# Patient Record
Sex: MALE | Race: WHITE | NOT HISPANIC OR LATINO | Employment: FULL TIME | URBAN - NONMETROPOLITAN AREA
[De-identification: names, ages, dates, MRNs, and addresses within clinical notes are randomized per-mention and may not be internally consistent; named-entity substitution may affect disease eponyms.]

---

## 2018-01-14 ENCOUNTER — OFFICE VISIT (OUTPATIENT)
Dept: RETAIL CLINIC | Facility: CLINIC | Age: 33
End: 2018-01-14

## 2018-01-14 VITALS
TEMPERATURE: 98.9 F | SYSTOLIC BLOOD PRESSURE: 134 MMHG | HEART RATE: 76 BPM | RESPIRATION RATE: 18 BRPM | DIASTOLIC BLOOD PRESSURE: 80 MMHG | OXYGEN SATURATION: 98 % | WEIGHT: 315 LBS

## 2018-01-14 DIAGNOSIS — R68.89 FLU-LIKE SYMPTOMS: Primary | ICD-10-CM

## 2018-01-14 DIAGNOSIS — Z20.828 EXPOSURE TO THE FLU: ICD-10-CM

## 2018-01-14 LAB
EXPIRATION DATE: NORMAL
FLUAV AG NPH QL: NORMAL
FLUBV AG NPH QL: NORMAL
INTERNAL CONTROL: NORMAL
Lab: NORMAL

## 2018-01-14 PROCEDURE — 99203 OFFICE O/P NEW LOW 30 MIN: CPT | Performed by: NURSE PRACTITIONER

## 2018-01-14 PROCEDURE — 87804 INFLUENZA ASSAY W/OPTIC: CPT | Performed by: NURSE PRACTITIONER

## 2018-01-14 RX ORDER — CITALOPRAM 20 MG/1
20 TABLET ORAL DAILY
COMMUNITY

## 2018-01-14 RX ORDER — OSELTAMIVIR PHOSPHATE 75 MG/1
75 CAPSULE ORAL 2 TIMES DAILY
Qty: 10 CAPSULE | Refills: 0 | Status: SHIPPED | OUTPATIENT
Start: 2018-01-14 | End: 2018-01-19

## 2018-01-14 NOTE — PROGRESS NOTES
Subjective   Kwadwo Katz is a 32 y.o. male.     Flu Symptoms   This is a new problem. The current episode started in the past 7 days. The problem occurs constantly. The problem has been unchanged. Associated symptoms include chills, congestion, coughing, fatigue, headaches and myalgias. Pertinent negatives include no abdominal pain, arthralgias, change in bowel habit, chest pain, fever, nausea, neck pain, sore throat or vomiting. Nothing aggravates the symptoms. He has tried nothing for the symptoms.        The following portions of the patient's history were reviewed and updated as appropriate: allergies, current medications, past family history, past medical history, past social history, past surgical history and problem list.    Review of Systems   Constitutional: Positive for chills and fatigue. Negative for fever.   HENT: Positive for congestion. Negative for sore throat and trouble swallowing.    Eyes: Negative for redness.   Respiratory: Positive for cough. Negative for shortness of breath and wheezing.    Cardiovascular: Negative for chest pain.   Gastrointestinal: Negative for abdominal pain, change in bowel habit, diarrhea, nausea and vomiting.   Musculoskeletal: Positive for myalgias. Negative for arthralgias and neck pain.   Neurological: Positive for headaches. Negative for dizziness and light-headedness.       Objective      /80  Pulse 76  Temp 98.9 °F (37.2 °C) (Oral)   Resp 18  Wt (!) 156 kg (343 lb)  SpO2 98%    Physical Exam   Constitutional: He is oriented to person, place, and time. He appears well-developed and well-nourished. No distress.   HENT:   Head: Normocephalic and atraumatic.   Right Ear: Hearing, tympanic membrane, external ear and ear canal normal.   Left Ear: Hearing, tympanic membrane, external ear and ear canal normal.   Mouth/Throat: Uvula is midline and mucous membranes are normal.   Eyes: Conjunctivae and EOM are normal. Pupils are equal, round, and reactive to  light.   Neck: Normal range of motion and full passive range of motion without pain. Neck supple.   Cardiovascular: Normal rate, regular rhythm, normal heart sounds and intact distal pulses.  Exam reveals no gallop and no friction rub.    No murmur heard.  Pulmonary/Chest: Effort normal. No stridor. No respiratory distress. He has no wheezes.   Abdominal: Soft.   Musculoskeletal: Normal range of motion.   Neurological: He is alert and oriented to person, place, and time. No cranial nerve deficit.   Skin: Skin is warm and dry.   Psychiatric: He has a normal mood and affect. His behavior is normal. Judgment and thought content normal.   Nursing note and vitals reviewed.      Assessment/Plan   Kwadwo was seen today for flu symptoms.    Diagnoses and all orders for this visit:    Flu-like symptoms  -     POC Influenza A / B    Exposure to the flu  -     POC Influenza A / B    Other orders  -     oseltamivir (TAMIFLU) 75 MG capsule; Take 1 capsule by mouth 2 (Two) Times a Day for 5 days.    SEEK IMMEDIATE MEDICAL CARE IF:  · You have trouble breathing, you become short of breath, or your skin or nails become bluish.  · You have severe pain or stiffness in the neck.  · You develop a sudden headache, or pain in the face or ear.  · You have nausea or vomiting that you cannot control.    Return to see your Primary Care Provider if symptoms worsen in 2-3 days.    VENKAT Torres

## 2018-01-14 NOTE — PATIENT INSTRUCTIONS

## 2018-06-02 ENCOUNTER — APPOINTMENT (OUTPATIENT)
Dept: CT IMAGING | Facility: HOSPITAL | Age: 33
End: 2018-06-02

## 2018-06-02 ENCOUNTER — APPOINTMENT (OUTPATIENT)
Dept: GENERAL RADIOLOGY | Facility: HOSPITAL | Age: 33
End: 2018-06-02

## 2018-06-02 ENCOUNTER — HOSPITAL ENCOUNTER (EMERGENCY)
Facility: HOSPITAL | Age: 33
Discharge: HOME | End: 2018-06-02
Admitting: EMERGENCY MEDICINE

## 2018-06-02 VITALS
OXYGEN SATURATION: 99 % | WEIGHT: 315 LBS | SYSTOLIC BLOOD PRESSURE: 130 MMHG | DIASTOLIC BLOOD PRESSURE: 89 MMHG | HEART RATE: 86 BPM | HEIGHT: 66 IN | RESPIRATION RATE: 16 BRPM | TEMPERATURE: 97.7 F | BODY MASS INDEX: 50.62 KG/M2
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
## 2018-06-02 DIAGNOSIS — S80.01XA CONTUSION OF RIGHT KNEE, INITIAL ENCOUNTER: ICD-10-CM

## 2018-06-02 PROCEDURE — 90715 TDAP VACCINE 7 YRS/> IM: CPT | Performed by: NURSE PRACTITIONER

## 2018-06-02 PROCEDURE — 90471 IMMUNIZATION ADMIN: CPT | Performed by: NURSE PRACTITIONER

## 2018-06-02 PROCEDURE — 99282 EMERGENCY DEPT VISIT SF MDM: CPT

## 2018-06-02 PROCEDURE — 25010000002 TDAP 5-2.5-18.5 LF-MCG/0.5 SUSPENSION: Performed by: NURSE PRACTITIONER

## 2018-06-02 PROCEDURE — 73562 X-RAY EXAM OF KNEE 3: CPT

## 2018-06-02 PROCEDURE — 99283 EMERGENCY DEPT VISIT LOW MDM: CPT

## 2018-06-02 PROCEDURE — 71046 X-RAY EXAM CHEST 2 VIEWS: CPT

## 2018-06-02 PROCEDURE — 73590 X-RAY EXAM OF LOWER LEG: CPT

## 2018-06-02 PROCEDURE — 72125 CT NECK SPINE W/O DYE: CPT

## 2018-06-02 RX ORDER — CYCLOBENZAPRINE HCL 10 MG
10 TABLET ORAL 3 TIMES DAILY PRN
Qty: 5 TABLET | Refills: 0 | OUTPATIENT
Start: 2018-06-02 | End: 2021-07-15

## 2018-06-02 RX ORDER — IBUPROFEN 800 MG/1
800 TABLET ORAL EVERY 8 HOURS PRN
Qty: 30 TABLET | Refills: 0 | OUTPATIENT
Start: 2018-06-02 | End: 2021-07-15

## 2018-06-02 RX ADMIN — TETANUS TOXOID, REDUCED DIPHTHERIA TOXOID AND ACELLULAR PERTUSSIS VACCINE, ADSORBED 0.5 ML: 5; 2.5; 8; 8; 2.5 SUSPENSION INTRAMUSCULAR at 14:57

## 2018-06-02 NOTE — DISCHARGE INSTRUCTIONS
Continue to monitor symptoms. Follow up with PCP . You may be sore the next few days. Ibuprofen, heat/ice, gentle stretches. You may take tylenol as directed on the bottle. You may use muscle relaxer if needed, caution as they will make you drowsy. Follow up for any new or worsening symptoms.
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
## 2018-12-24 ENCOUNTER — APPOINTMENT (OUTPATIENT)
Dept: CT IMAGING | Age: 33
End: 2018-12-24
Payer: COMMERCIAL

## 2018-12-24 ENCOUNTER — HOSPITAL ENCOUNTER (EMERGENCY)
Age: 33
Discharge: HOME | End: 2018-12-24
Attending: EMERGENCY MEDICINE
Payer: COMMERCIAL

## 2018-12-24 VITALS
DIASTOLIC BLOOD PRESSURE: 90 MMHG | TEMPERATURE: 98.9 F | HEART RATE: 80 BPM | BODY MASS INDEX: 50.62 KG/M2 | RESPIRATION RATE: 18 BRPM | SYSTOLIC BLOOD PRESSURE: 138 MMHG | WEIGHT: 315 LBS | OXYGEN SATURATION: 98 % | HEIGHT: 66 IN

## 2018-12-24 DIAGNOSIS — M54.32 SCIATICA OF LEFT SIDE: ICD-10-CM

## 2018-12-24 DIAGNOSIS — M54.16 LUMBAR RADICULOPATHY, ACUTE: Primary | ICD-10-CM

## 2018-12-24 PROCEDURE — 72131 CT LUMBAR SPINE W/O DYE: CPT

## 2018-12-24 PROCEDURE — 99284 EMERGENCY DEPT VISIT MOD MDM: CPT | Performed by: EMERGENCY MEDICINE

## 2018-12-24 PROCEDURE — 6360000002 HC RX W HCPCS: Performed by: EMERGENCY MEDICINE

## 2018-12-24 PROCEDURE — 99283 EMERGENCY DEPT VISIT LOW MDM: CPT

## 2018-12-24 PROCEDURE — 96372 THER/PROPH/DIAG INJ SC/IM: CPT

## 2018-12-24 RX ORDER — CYCLOBENZAPRINE HCL 10 MG
10 TABLET ORAL 3 TIMES DAILY PRN
Qty: 20 TABLET | Refills: 0 | Status: SHIPPED | OUTPATIENT
Start: 2018-12-24 | End: 2019-01-03

## 2018-12-24 RX ORDER — BUSPIRONE HYDROCHLORIDE 10 MG/1
10 TABLET ORAL 3 TIMES DAILY
COMMUNITY

## 2018-12-24 RX ORDER — HYDROCODONE BITARTRATE AND ACETAMINOPHEN 7.5; 325 MG/1; MG/1
1 TABLET ORAL EVERY 6 HOURS PRN
Qty: 15 TABLET | Refills: 0 | Status: SHIPPED | OUTPATIENT
Start: 2018-12-24 | End: 2018-12-27

## 2018-12-24 RX ORDER — ONDANSETRON 4 MG/1
4 TABLET, ORALLY DISINTEGRATING ORAL ONCE
Status: COMPLETED | OUTPATIENT
Start: 2018-12-24 | End: 2018-12-24

## 2018-12-24 RX ORDER — METHYLPREDNISOLONE 4 MG/1
TABLET ORAL
Qty: 1 KIT | Refills: 0 | Status: SHIPPED | OUTPATIENT
Start: 2018-12-24 | End: 2018-12-30

## 2018-12-24 RX ORDER — CITALOPRAM 20 MG/1
20 TABLET ORAL DAILY
COMMUNITY

## 2018-12-24 RX ADMIN — ONDANSETRON 4 MG: 4 TABLET, ORALLY DISINTEGRATING ORAL at 22:09

## 2018-12-24 RX ADMIN — Medication 2 MG: at 22:09

## 2018-12-24 ASSESSMENT — ENCOUNTER SYMPTOMS
DIARRHEA: 0
CHOKING: 0
CONSTIPATION: 0
VOICE CHANGE: 0
BACK PAIN: 1
SHORTNESS OF BREATH: 0
SORE THROAT: 0
NAUSEA: 0
APNEA: 0
FACIAL SWELLING: 0
SINUS PRESSURE: 0
BLOOD IN STOOL: 0
EYE DISCHARGE: 0

## 2018-12-24 ASSESSMENT — PAIN SCALES - GENERAL
PAINLEVEL_OUTOF10: 9
PAINLEVEL_OUTOF10: 9

## 2018-12-24 ASSESSMENT — PAIN DESCRIPTION - PAIN TYPE: TYPE: ACUTE PAIN

## 2018-12-25 NOTE — ED PROVIDER NOTES
No data to display    All other labs were within normal range or not returned as of this dictation. EMERGENCY DEPARTMENT COURSE and DIFFERENTIALDIAGNOSIS/MDM:   Vitals:    Vitals:    12/24/18 1957   BP: (!) 146/94   Pulse: 84   Resp: 20   Temp: 99.2 °F (37.3 °C)   SpO2: 98%   Weight: (!) 345 lb (156.5 kg)   Height: 5' 6\" (1.676 m)       MDM  Number of Diagnoses or Management Options  Lumbar radiculopathy, acute:   Sciatica of left side:   Diagnosis management comments: We discussed the diagnosis and aftercare follow-up. CONSULTS:  None    PROCEDURES:  Unless otherwise notedbelow, none     Procedures    FINAL IMPRESSION     1. Lumbar radiculopathy, acute    2. Sciatica of left side          DISPOSITION/PLAN   DISPOSITION Decision To Discharge 12/24/2018 09:51:58 PM      PATIENT REFERRED TO:  @FUP@    DISCHARGE MEDICATIONS:  New Prescriptions    CYCLOBENZAPRINE (FLEXERIL) 10 MG TABLET    Take 1 tablet by mouth 3 times daily as needed for Muscle spasms    HYDROCODONE-ACETAMINOPHEN (NORCO) 7.5-325 MG PER TABLET    Take 1 tablet by mouth every 6 hours as needed for Pain for up to 3 days. . Take lowest dose possible to manage pain. METHYLPREDNISOLONE (MEDROL, KAROLINA,) 4 MG TABLET    By mouth. Attestation: The Prescription Monitoring Report for this patient was reviewed today.  Frances Lamas #[id number]) Mckay Fabian MD)    (Please note that portions of this note were completed with a voice recognition program.  Efforts were made to edit the dictations butoccasionally words are mis-transcribed.)    Mckay Fabian MD (electronically signed)  AttendingEmergency Physician          Mckay Fabian MD  12/24/18 7025

## 2018-12-26 ENCOUNTER — OFFICE VISIT (OUTPATIENT)
Dept: URGENT CARE | Age: 33
End: 2018-12-26
Payer: COMMERCIAL

## 2018-12-26 VITALS
TEMPERATURE: 98.4 F | HEIGHT: 65 IN | OXYGEN SATURATION: 98 % | HEART RATE: 79 BPM | SYSTOLIC BLOOD PRESSURE: 143 MMHG | DIASTOLIC BLOOD PRESSURE: 85 MMHG | WEIGHT: 315 LBS | RESPIRATION RATE: 22 BRPM | BODY MASS INDEX: 52.48 KG/M2

## 2018-12-26 DIAGNOSIS — M54.16 ACUTE LUMBAR RADICULOPATHY: Primary | ICD-10-CM

## 2018-12-26 PROCEDURE — 99201 PR OFFICE OUTPATIENT NEW 10 MINUTES: CPT | Performed by: NURSE PRACTITIONER

## 2020-02-05 ENCOUNTER — TRANSCRIBE ORDERS (OUTPATIENT)
Dept: ADMINISTRATIVE | Facility: HOSPITAL | Age: 35
End: 2020-02-05

## 2020-02-05 DIAGNOSIS — R20.2 PARESTHESIA OF SKIN: Primary | ICD-10-CM

## 2020-02-24 ENCOUNTER — HOSPITAL ENCOUNTER (OUTPATIENT)
Dept: NEUROLOGY | Facility: HOSPITAL | Age: 35
Discharge: HOME | End: 2020-02-24
Admitting: FAMILY MEDICINE

## 2020-02-24 DIAGNOSIS — R20.2 PARESTHESIA OF SKIN: ICD-10-CM

## 2020-02-24 PROCEDURE — 95909 NRV CNDJ TST 5-6 STUDIES: CPT

## 2020-02-24 PROCEDURE — 95886 MUSC TEST DONE W/N TEST COMP: CPT

## 2021-07-15 ENCOUNTER — HOSPITAL ENCOUNTER (EMERGENCY)
Facility: HOSPITAL | Age: 36
Discharge: HOME | End: 2021-07-15
Admitting: EMERGENCY MEDICINE

## 2021-07-15 ENCOUNTER — APPOINTMENT (OUTPATIENT)
Dept: GENERAL RADIOLOGY | Facility: HOSPITAL | Age: 36
End: 2021-07-15

## 2021-07-15 VITALS
RESPIRATION RATE: 18 BRPM | OXYGEN SATURATION: 98 % | TEMPERATURE: 98.9 F | HEART RATE: 106 BPM | WEIGHT: 315 LBS | HEIGHT: 66 IN | BODY MASS INDEX: 50.62 KG/M2 | SYSTOLIC BLOOD PRESSURE: 142 MMHG | DIASTOLIC BLOOD PRESSURE: 78 MMHG

## 2021-07-15 DIAGNOSIS — S61.012A LACERATION OF LEFT THUMB WITHOUT FOREIGN BODY WITHOUT DAMAGE TO NAIL, INITIAL ENCOUNTER: Primary | ICD-10-CM

## 2021-07-15 PROCEDURE — 73140 X-RAY EXAM OF FINGER(S): CPT

## 2021-07-15 PROCEDURE — 25010000003 LIDOCAINE 1 % SOLUTION: Performed by: NURSE PRACTITIONER

## 2021-07-15 PROCEDURE — 99283 EMERGENCY DEPT VISIT LOW MDM: CPT

## 2021-07-15 RX ORDER — HYDROCODONE BITARTRATE AND ACETAMINOPHEN 5; 325 MG/1; MG/1
1 TABLET ORAL ONCE
Status: COMPLETED | OUTPATIENT
Start: 2021-07-15 | End: 2021-07-15

## 2021-07-15 RX ORDER — CEPHALEXIN 500 MG/1
500 CAPSULE ORAL 3 TIMES DAILY
Qty: 21 CAPSULE | Refills: 0 | Status: SHIPPED | OUTPATIENT
Start: 2021-07-15 | End: 2021-07-22

## 2021-07-15 RX ORDER — HYDROCODONE BITARTRATE AND ACETAMINOPHEN 5; 325 MG/1; MG/1
1 TABLET ORAL EVERY 6 HOURS PRN
Qty: 8 TABLET | Refills: 0 | Status: SHIPPED | OUTPATIENT
Start: 2021-07-15 | End: 2021-07-17

## 2021-07-15 RX ORDER — LIDOCAINE HYDROCHLORIDE 10 MG/ML
10 INJECTION, SOLUTION INFILTRATION; PERINEURAL ONCE
Status: COMPLETED | OUTPATIENT
Start: 2021-07-15 | End: 2021-07-15

## 2021-07-15 RX ADMIN — HYDROCODONE BITARTRATE AND ACETAMINOPHEN 1 TABLET: 5; 325 TABLET ORAL at 08:53

## 2021-07-15 RX ADMIN — LIDOCAINE HYDROCHLORIDE 10 ML: 10 INJECTION, SOLUTION INFILTRATION; PERINEURAL at 09:54
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
## 2021-07-15 NOTE — DISCHARGE INSTRUCTIONS
Return to ER if there is any signs of infection, inflammation, or uncontrolled pain.     Return to ER or follow up with PCP to have sutures removed in 10 days.     Keep area clean and dry, remove the splint and dressing after 24 hours. Then keep area dry, may apply antibiotic ointment and bandaid as needed to cover from getting dirty.

## 2023-09-19 ENCOUNTER — TRANSCRIBE ORDERS (OUTPATIENT)
Dept: ADMINISTRATIVE | Facility: HOSPITAL | Age: 38
End: 2023-09-19
Payer: COMMERCIAL

## 2023-09-19 ENCOUNTER — HOSPITAL ENCOUNTER (OUTPATIENT)
Dept: GENERAL RADIOLOGY | Facility: HOSPITAL | Age: 38
Discharge: HOME | End: 2023-09-19
Admitting: PHYSICIAN ASSISTANT
Payer: COMMERCIAL

## 2023-09-19 DIAGNOSIS — M79.671 PAIN IN RIGHT FOOT: Primary | ICD-10-CM

## 2023-09-19 DIAGNOSIS — M79.671 PAIN IN RIGHT FOOT: ICD-10-CM

## 2023-09-19 PROCEDURE — 73630 X-RAY EXAM OF FOOT: CPT

## 2024-08-26 NOTE — PROGRESS NOTES
[facility] - PODIATRY    Today's Date: 08/28/2024     Patient Name: Kwadwo Katz  MRN: [id number]  CSN: [id number]  PCP: Stacey Garrison DO  Referring Provider: Stacey Garrison,*    SUBJECTIVE     Chief Complaint   Patient presents with    [facility] Care     Stacey Garrison 06/04/2024 PILI HEEL PAIN - NO INJURY - XR DONE - NO SX - pt states right foot had been hurting for a while, was diagnosed with xrays having bone spurs but not sure that was what caused the pain, right foot got better since making appt but now left foot heel has been hurting on back- pt pain 5/10 at worst, more the left foot now     HPI: Kwadwo Katz, a 38 y.o.male, comes to clinic as a(n) new patient complaining of foot pain. Patient has h/o depression, appendectomy . Patient presents with pain in the posterior left heel occasionally. He has also had pain in his plantar left heel from time to time in the mornings.  He reports he is in no pain today. Patient report he has previously had pain in the right posterior heel, but this has since resolved. He reports he had an xray of his right foot and it showed a calcaneal spur at that time.  Patient is non-diabetic. Denies pain. Denies previous treatment. Denies any constitutional symptoms. No other pedal complaints at this time.    Past Medical History:   Diagnosis Date    Depression      Past Surgical History:   Procedure Laterality Date    APPENDECTOMY       History reviewed. No pertinent family history.  Social History     Socioeconomic History    Marital status: Single   Tobacco Use    Smoking status: Never    Smokeless tobacco: Never   Vaping Use    Vaping status: Never Used   Substance and Sexual Activity    Alcohol use: No    Drug use: No    Sexual activity: Yes     Partners: Female     No Known Allergies  Current Outpatient Medications   Medication Sig Dispense Refill    citalopram (CeleXA) 20 MG tablet Take 1 tablet by mouth Daily.       No current  facility-administered medications for this visit.     Review of Systems   Constitutional:  Negative for activity change.   HENT:  Negative for congestion.    Respiratory:  Negative for apnea and shortness of breath.    Gastrointestinal:  Negative for abdominal pain.   Musculoskeletal:  Negative for arthralgias and back pain.        Foot pain   Psychiatric/Behavioral:  Negative for agitation, behavioral problems and confusion.        OBJECTIVE     Vitals:    08/28/24 1045   BP: 130/68   Pulse: 61   SpO2: 97%       PHYSICAL EXAM  GEN:   Accompanied by none.     Foot/Ankle Exam    GENERAL  Appearance:  appears stated age  Orientation:  AAOx3  Affect:  appropriate  Gait:  unimpaired  Assistance:  independent  Right shoe gear: casual shoe  Left shoe gear: casual shoe    VASCULAR     Right Foot Vascularity   Dorsalis pedis:  2+  Posterior tibial:  2+  Skin temperature:  warm  Edema grading:  None  CFT:  3  Pedal hair growth:  Present  Varicosities:  none     Left Foot Vascularity   Dorsalis pedis:  2+  Posterior tibial:  2+  Skin temperature:  warm  Edema grading:  None  CFT:  3  Pedal hair growth:  Present  Varicosities:  none     NEUROLOGIC     Right Foot Neurologic   Normal sensation    Light touch sensation: normal  Vibratory sensation: normal  Hot/Cold sensation: normal     Left Foot Neurologic   Normal sensation    Light touch sensation: normal  Vibratory sensation: normal  Hot/Cold sensation:  normal    MUSCULOSKELETAL     Right Foot Musculoskeletal   Ecchymosis:  none  Tenderness:  none    Arch:  Normal     Left Foot Musculoskeletal   Ecchymosis:  none  Tenderness:  none  Arch:  Normal    MUSCLE STRENGTH     Right Foot Muscle Strength   Foot dorsiflexion:  5  Foot plantar flexion:  5  Foot inversion:  5  Foot eversion:  5     Left Foot Muscle Strength   Foot dorsiflexion:  5  Foot plantar flexion:  5  Foot inversion:  5  Foot eversion:  5    RANGE OF MOTION     Right Foot Range of Motion   Foot and ankle ROM  within normal limits       Left Foot Range of Motion   Foot and ankle ROM within normal limits      DERMATOLOGIC      Right Foot Dermatologic   Skin  Right foot skin is intact.      Left Foot Dermatologic   Skin  Left foot skin is intact.       RADIOLOGY/NUCLEAR:  No results found.    LABORATORY/CULTURE RESULTS:      PATHOLOGY RESULTS:       ASSESSMENT/PLAN     Diagnoses and all orders for this visit:    1. Pain of left heel (Primary)      Comprehensive lower extremity examination and evaluation was performed.  Discussed findings and treatment plan including risks, benefits, and treatment options with patient in detail. Patient agreed with treatment plan.  Discussed with patient that the pain he describes is consistent with achilles tendonitis and plantar fasciitis.  Due to no pain today, it is not necessary for patient to begin wearing a CAM boot.   Discussed with patient importance of daily stretching exercises and heel cups. Patient verbalized understanding.   Conservative therapy including daily stretching (demonstrated proper way of performing), icing 3x daily, decreased activity, and nsaids PRN.   An After Visit Summary was printed and given to the patient at discharge, including (if requested) any available informative/educational handouts regarding diagnosis, treatment, or medications. All questions were answered to patient/family satisfaction. Should symptoms fail to improve or worsen they agree to call or return to clinic or to go to the Emergency Department. Discussed the importance of following up with any needed screening tests/labs/specialist appointments and any requested follow-up recommended by me today. Importance of maintaining follow-up discussed and patient accepts that missed appointments can delay diagnosis and potentially lead to worsening of conditions.  Return if symptoms worsen or fail to improve., or sooner if acute issues arise.      This document has been electronically signed by Charity  VENKAT Longoria on August 28, 2024 12:53 CDT

## 2024-08-27 ENCOUNTER — TELEPHONE (OUTPATIENT)
Age: 39
End: 2024-08-27
Payer: COMMERCIAL

## 2024-08-28 ENCOUNTER — OFFICE VISIT (OUTPATIENT)
Age: 39
End: 2024-08-28
Payer: COMMERCIAL

## 2024-08-28 VITALS
HEIGHT: 66 IN | WEIGHT: 315 LBS | DIASTOLIC BLOOD PRESSURE: 68 MMHG | SYSTOLIC BLOOD PRESSURE: 130 MMHG | BODY MASS INDEX: 50.62 KG/M2 | HEART RATE: 61 BPM | OXYGEN SATURATION: 97 %

## 2024-08-28 DIAGNOSIS — M79.672 PAIN OF LEFT HEEL: Primary | ICD-10-CM

## 2024-08-28 PROBLEM — H60.91 OTITIS EXTERNA OF RIGHT EAR: Status: ACTIVE | Noted: 2018-06-25

## 2024-08-28 PROBLEM — M79.671 PAIN IN RIGHT FOOT: Status: ACTIVE | Noted: 2023-09-12

## 2024-08-28 PROBLEM — B37.9 CANDIDIASIS: Status: ACTIVE | Noted: 2017-07-05

## 2024-08-28 PROBLEM — B02.9 HERPES ZOSTER: Status: ACTIVE | Noted: 2017-10-16

## 2024-08-28 PROBLEM — L29.9 PRURITUS: Status: ACTIVE | Noted: 2017-07-05

## 2024-08-28 PROBLEM — M25.561 ARTHRALGIA OF RIGHT KNEE: Status: ACTIVE | Noted: 2018-06-11

## 2024-08-28 PROBLEM — M79.671 PAIN OF BOTH HEELS: Status: ACTIVE | Noted: 2024-06-04

## 2024-08-28 PROBLEM — J06.9 ACUTE UPPER RESPIRATORY INFECTION: Status: ACTIVE | Noted: 2023-09-12

## 2024-08-28 PROBLEM — R20.2 PARESTHESIA: Status: ACTIVE | Noted: 2019-08-13

## 2024-08-28 PROBLEM — M79.604 PAIN OF RIGHT LOWER EXTREMITY: Status: ACTIVE | Noted: 2018-06-11

## 2024-08-28 PROBLEM — H10.31 ACUTE CONJUNCTIVITIS OF RIGHT EYE: Status: ACTIVE | Noted: 2018-01-05

## 2024-08-28 PROBLEM — J01.90 ACUTE SINUSITIS: Status: ACTIVE | Noted: 2018-06-25
